# Patient Record
Sex: MALE | Race: WHITE | NOT HISPANIC OR LATINO | URBAN - METROPOLITAN AREA
[De-identification: names, ages, dates, MRNs, and addresses within clinical notes are randomized per-mention and may not be internally consistent; named-entity substitution may affect disease eponyms.]

---

## 2018-08-23 ENCOUNTER — OFFICE VISIT (OUTPATIENT)
Dept: URBAN - METROPOLITAN AREA CLINIC 18 | Facility: CLINIC | Age: 78
End: 2018-08-23
Payer: COMMERCIAL

## 2018-08-23 DIAGNOSIS — H18.413 ARCUS SENILIS, BILATERAL: ICD-10-CM

## 2018-08-23 PROCEDURE — 92014 COMPRE OPH EXAM EST PT 1/>: CPT | Performed by: OPTOMETRIST

## 2018-08-23 ASSESSMENT — INTRAOCULAR PRESSURE
OD: 16
OS: 19

## 2018-08-23 ASSESSMENT — KERATOMETRY
OS: 41.88
OD: 42.13

## 2018-08-23 NOTE — IMPRESSION/PLAN
Impression: Dry eye syndrome of bilateral lacrimal glands: H04.123.  Plan: Recommend AFTs for comfort

## 2018-08-23 NOTE — IMPRESSION/PLAN
Impression: Arcus senilis, bilateral: H18.413. Plan: Discussed diagnosis in detail with patient. No treatment is required at this time. Will continue to observe condition and or symptoms.

## 2018-08-23 NOTE — IMPRESSION/PLAN
Impression: Presence of intraocular lens: Z96.1. OU.
S/P Yag Laser OS only Plan: Will continue to observe condition and or symptoms. No treatment is required at this time.

## 2019-09-19 ENCOUNTER — OFFICE VISIT (OUTPATIENT)
Dept: URBAN - METROPOLITAN AREA CLINIC 18 | Facility: CLINIC | Age: 79
End: 2019-09-19
Payer: COMMERCIAL

## 2019-09-19 DIAGNOSIS — H04.123 DRY EYE SYNDROME OF BILATERAL LACRIMAL GLANDS: ICD-10-CM

## 2019-09-19 DIAGNOSIS — E11.9 TYPE 2 DIABETES MELLITUS W/O COMPLICATION: Primary | ICD-10-CM

## 2019-09-19 DIAGNOSIS — H02.831 DERMATOCHALASIS OF RIGHT UPPER EYELID: ICD-10-CM

## 2019-09-19 DIAGNOSIS — H02.834 DERMATOCHALASIS OF LEFT UPPER EYELID: ICD-10-CM

## 2019-09-19 DIAGNOSIS — Z96.1 PRESENCE OF INTRAOCULAR LENS: ICD-10-CM

## 2019-09-19 PROCEDURE — 92014 COMPRE OPH EXAM EST PT 1/>: CPT | Performed by: OPTOMETRIST

## 2019-09-19 ASSESSMENT — INTRAOCULAR PRESSURE
OD: 13
OS: 14

## 2019-09-19 ASSESSMENT — KERATOMETRY
OS: 41.88
OD: 41.88

## 2019-09-19 NOTE — IMPRESSION/PLAN
Impression: Presence of intraocular lens: Z96.1. OU. S/p CE IOL , S/p YAG OS 2015 Plan: PC IOL (s) in good position. Will continue to monitor with yearly exams.

## 2019-09-19 NOTE — IMPRESSION/PLAN
Impression: Type 2 diabetes mellitus w/o complication: T43.7. Plan: Diabetes type II: no background retinopathy, no signs of neovascularization noted. Discussed ocular and systemic benefits of blood sugar control.

## 2019-09-19 NOTE — IMPRESSION/PLAN
Impression: Dry eye syndrome of bilateral lacrimal glands: H04.123. Plan: Discussed diagnosis in detail with patient. Advised patient of condition. Advised to start using warm compresses BID OU x 10 min and massage lids afterword's. Patient may start using lubricant drops QHS OU- coupon given today for Refresh gel drops.

## 2019-09-19 NOTE — IMPRESSION/PLAN
Impression: Dermatochalasis of right upper eyelid: H02.831. Plan: Advised patient of condition. Discussed risks and benefits and patient understands. No treatment is required at this time. Will continue to observe condition and or symptoms.

## 2019-10-24 ENCOUNTER — OFFICE VISIT (OUTPATIENT)
Dept: URBAN - METROPOLITAN AREA CLINIC 18 | Facility: CLINIC | Age: 79
End: 2019-10-24
Payer: COMMERCIAL

## 2019-10-24 DIAGNOSIS — H52.4 PRESBYOPIA: Primary | ICD-10-CM

## 2019-10-24 PROCEDURE — 92012 INTRM OPH EXAM EST PATIENT: CPT | Performed by: OPTOMETRIST

## 2019-10-24 ASSESSMENT — VISUAL ACUITY
OD: 20/20
OS: 20/20

## 2019-10-24 ASSESSMENT — KERATOMETRY
OS: 42.25
OD: 41.75

## 2023-05-03 ENCOUNTER — OFFICE VISIT (OUTPATIENT)
Dept: URBAN - METROPOLITAN AREA CLINIC 18 | Facility: CLINIC | Age: 83
End: 2023-05-03
Payer: COMMERCIAL

## 2023-05-03 DIAGNOSIS — Z96.1 PRESENCE OF INTRAOCULAR LENS: Primary | ICD-10-CM

## 2023-05-03 DIAGNOSIS — H04.123 DRY EYE SYNDROME OF BILATERAL LACRIMAL GLANDS: ICD-10-CM

## 2023-05-03 PROCEDURE — 99203 OFFICE O/P NEW LOW 30 MIN: CPT | Performed by: OPTOMETRIST

## 2023-05-03 ASSESSMENT — INTRAOCULAR PRESSURE
OS: 19
OD: 18